# Patient Record
Sex: MALE | Race: WHITE | NOT HISPANIC OR LATINO | URBAN - METROPOLITAN AREA
[De-identification: names, ages, dates, MRNs, and addresses within clinical notes are randomized per-mention and may not be internally consistent; named-entity substitution may affect disease eponyms.]

---

## 2018-05-30 ENCOUNTER — OFFICE VISIT (OUTPATIENT)
Dept: FAMILY MEDICINE | Facility: CLINIC | Age: 23
End: 2018-05-30
Payer: COMMERCIAL

## 2018-05-30 VITALS
RESPIRATION RATE: 16 BRPM | HEART RATE: 60 BPM | TEMPERATURE: 97.8 F | DIASTOLIC BLOOD PRESSURE: 60 MMHG | WEIGHT: 161 LBS | BODY MASS INDEX: 23.85 KG/M2 | OXYGEN SATURATION: 97 % | HEIGHT: 69 IN | SYSTOLIC BLOOD PRESSURE: 112 MMHG

## 2018-05-30 DIAGNOSIS — J30.2 SEASONAL ALLERGIC RHINITIS, UNSPECIFIED CHRONICITY, UNSPECIFIED TRIGGER: ICD-10-CM

## 2018-05-30 DIAGNOSIS — Z00.121 ENCOUNTER FOR WCC (WELL CHILD CHECK) WITH ABNORMAL FINDINGS: Primary | ICD-10-CM

## 2018-05-30 DIAGNOSIS — R06.00 DYSPNEA, UNSPECIFIED TYPE: ICD-10-CM

## 2018-05-30 DIAGNOSIS — Z23 IMMUNIZATION DUE: ICD-10-CM

## 2018-05-30 RX ORDER — CETIRIZINE HYDROCHLORIDE 10 MG/1
10 TABLET ORAL EVERY EVENING
Qty: 30 TABLET | Refills: 1 | Status: SHIPPED | OUTPATIENT
Start: 2018-05-30 | End: 2018-08-20

## 2018-05-30 RX ORDER — FLUTICASONE PROPIONATE 50 MCG
1-2 SPRAY, SUSPENSION (ML) NASAL DAILY
Qty: 3 BOTTLE | Refills: 1 | Status: SHIPPED | OUTPATIENT
Start: 2018-05-30

## 2018-05-30 NOTE — MR AVS SNAPSHOT
"              After Visit Summary   5/30/2018    Saad Lyles Jr.    MRN: [id number]           Patient Information     Date Of Birth          1995        Visit Information        Provider Department      5/30/2018 1:20 PM Rosenstein, Benjamin, MD [facility]        Today's Diagnoses     Encounter for WCC (well child check) with abnormal findings    -  1    Immunization due        Seasonal allergic rhinitis, unspecified chronicity, unspecified trigger          Care Instructions    Thanks for coming in today    For your breathing  - Use the flonase nasal spray daily to help with your allergies. Spray back and toward your ears  - Use the zyrtec (cetirizine) daily to help control your allergies  - Come back in 1 week so we can do tests to check if your breathing issues are also caused by asthma. If so, then we can treat it better.    For your stomach  - Take takes as needed to help control symptoms  - If this doesn't work, you can take Zantac up to twice a day as needed    We'll see you again in a week!          Follow-ups after your visit        Who to contact     Please call your clinic at [phone] to:    Ask questions about your health    Make or cancel appointments    Discuss your medicines    Learn about your test results    Speak to your doctor            Additional Information About Your Visit        Care EveryWhere ID     This is your Care EveryWhere ID. This could be used by other organizations to access your [facility] medical records  [id number]        Your Vitals Were     Pulse Temperature Respirations Height Pulse Oximetry BMI (Body Mass Index)    60 97.8  F (36.6  C) (Oral) 16 5' 8.7\" (174.5 cm) 97% 23.98 kg/m2       Blood Pressure from Last 3 Encounters:   05/30/18 112/60   08/12/16 112/52   05/09/16 100/64    Weight from Last 3 Encounters:   05/30/18 161 lb (73 kg)   08/12/16 157 lb 9.6 oz (71.5 kg) (59 %)*   05/09/16 159 lb 6.4 oz (72.3 kg) (63 %)*     * Growth percentiles are " based on [facility] 2-20 Years data.              We Performed the Following     ADMIN VACCINE, INITIAL     HPV9 (Gardasil 9 )     SCREENING TEST, PURE TONE, AIR ONLY     SCREENING, VISUAL ACUITY, QUANTITATIVE, BILAT          Today's Medication Changes          These changes are accurate as of 5/30/18  2:24 PM.  If you have any questions, ask your nurse or doctor.               Start taking these medicines.        Dose/Directions    cetirizine 10 MG tablet   Commonly known as:  zyrTEC   Used for:  Seasonal allergic rhinitis, unspecified chronicity, unspecified trigger   Started by:  Rosenstein, Benjamin, MD        Dose:  10 mg   Take 1 tablet (10 mg) by mouth every evening   Quantity:  30 tablet   Refills:  1         These medicines have changed or have updated prescriptions.        Dose/Directions    * fluticasone 50 MCG/ACT spray   Commonly known as:  FLONASE   This may have changed:  Another medication with the same name was added. Make sure you understand how and when to take each.   Used for:  Seasonal allergic rhinitis   Changed by:  Rosenstein, Benjamin, MD        Dose:  1-2 spray   Spray 1-2 sprays into both nostrils daily   Quantity:  16 g   Refills:  0       * fluticasone 50 MCG/ACT spray   Commonly known as:  FLONASE   This may have changed:  You were already taking a medication with the same name, and this prescription was added. Make sure you understand how and when to take each.   Used for:  Seasonal allergic rhinitis, unspecified chronicity, unspecified trigger   Changed by:  Rosenstein, Benjamin, MD        Dose:  1-2 spray   Spray 1-2 sprays into both nostrils daily   Quantity:  3 Bottle   Refills:  1       * Notice:  This list has 2 medication(s) that are the same as other medications prescribed for you. Read the directions carefully, and ask your doctor or other care provider to review them with you.         Where to get your medicines      These medications were sent to [id number] - [place], [place] - [address] AT [facility] [place] [facility] [address]  [address], [place] [address]     Phone:  [phone]     cetirizine 10 MG tablet    fluticasone 50 MCG/ACT spray                Primary Care Provider Office Phone # Fax #    Antonio Hickey [phone]       [facility] [address]        Equal Access to Services     COLT STEINER AH: Hadii aad ku hadasho Soomaali, waaxda luqadaha, qaybta kaalmada adeegyada, waxay idiin hayaan adeeg kharash la'aan ah. So [facility] [phone].    ATENCIÓN: Si habla español, tiene a zamora disposición servicios gratuitos de asistencia lingüística. [facility] [phone].    We comply with applicable federal civil rights laws and [place] laws. We do not discriminate on the basis of race, color, national origin, age, disability, sex, sexual orientation, or gender identity.            Thank you!     Thank you for choosing [facility]  for your care. Our goal is always to provide you with excellent care. Hearing back from our patients is one way we can continue to improve our services. Please take a few minutes to complete the written survey that you may receive in the mail after your visit with us. Thank you!             Your Updated Medication List - Protect others around you: Learn how to safely use, store and throw away your medicines at www.disposemymeds.org.          This list is accurate as of 5/30/18  2:24 PM.  Always use your most recent med list.                   Brand Name Dispense Instructions for use Diagnosis    cetirizine 10 MG tablet    zyrTEC    30 tablet    Take 1 tablet (10 mg) by mouth every evening    Seasonal allergic rhinitis, unspecified chronicity, unspecified trigger       fexofenadine 180 MG tablet    ALLEGRA    30 tablet    Take 1 tablet (180 mg) by mouth daily    Seasonal allergic rhinitis       * fluticasone 50 MCG/ACT spray    FLONASE    16 g    Spray 1-2 sprays into both nostrils  daily    Seasonal allergic rhinitis       * fluticasone 50 MCG/ACT spray    FLONASE    3 Bottle    Spray 1-2 sprays into both nostrils daily    Seasonal allergic rhinitis, unspecified chronicity, unspecified trigger       * Notice:  This list has 2 medication(s) that are the same as other medications prescribed for you. Read the directions carefully, and ask your doctor or other care provider to review them with you.

## 2018-05-30 NOTE — PATIENT INSTRUCTIONS
Thanks for coming in today    For your breathing  - Use the flonase nasal spray daily to help with your allergies. Spray back and toward your ears  - Use the zyrtec (cetirizine) daily to help control your allergies  - Come back in 1 week so we can do tests to check if your breathing issues are also caused by asthma. If so, then we can treat it better.    For your stomach  - Take takes as needed to help control symptoms  - If this doesn't work, you can take Zantac up to twice a day as needed    We'll see you again in a week!

## 2018-05-30 NOTE — PROGRESS NOTES
"    Child & Teen Check Up Year 18-20       Health History       Growth Percentile:    Wt Readings from Last 3 Encounters:   05/30/18 161 lb (73 kg)   08/12/16 157 lb 9.6 oz (71.5 kg) (59 %)*   05/09/16 159 lb 6.4 oz (72.3 kg) (63 %)*     * Growth percentiles are based on [facility] 2-20 Years data.      Ht Readings from Last 2 Encounters:   05/30/18 5' 8.7\" (174.5 cm)   08/12/16 5' 8.5\" (174 cm) (36 %)*     * Growth percentiles are based on [facility] 2-20 Years data.    Normalized BMI data available only for age 0 to 20 years.    Visit Vitals: /60  Pulse 60  Temp 97.8  F (36.6  C) (Oral)  Resp 16  Ht 5' 8.7\" (174.5 cm)  Wt 161 lb (73 kg)  SpO2 97%  BMI 23.98 kg/m2  BP Percentile: Normalized stature-for-age data not available for patients older than 20 years.     Informant: Patient    Patient, Family speaks English   Family History:   Family History   Problem Relation Age of Onset     Asthma Father      Migraines Other      Seizure Disorder Other      Hypertension Other        Dyslipidemia Screening:  Pediatric hyperlipidemia risk factors discussed today: No increased risk  Lipid screening performed (recommended if any risk factors): No    Social History:     Did the family/guardian worry about wether their food would run out before they got money to buy more? No  Did the family/guardian find that the food they bought didn't last long enough and they didn't have money to get more?  No    Social History     Social History     Marital status: Single     Spouse name: N/A     Number of children: N/A     Years of education: N/A     Occupational History     Student      Social History Main Topics     Smoking status: Passive Smoke Exposure - Never Smoker     Smokeless tobacco: Never Used     Alcohol use No     Drug use: No     Sexual activity: Not Asked     Other Topics Concern     None     Social History Narrative    Education completed so far: grade 11th           Medical History:   Past Medical History:   Diagnosis Date " "    Depression        Family History and past Medical History reviewed and unchanged/updated.    Vision Screen: Passed.  Hearing Screen: Passed.  Parental/or patient concerns:   Allergies: Seasonal allergies. Worse with warm weather. Difficulty with being outside, dust, cat at home. Notes symptoms with red eyes, runny nose. Benadryl has been helpful. Mother states her inhaler also has been helpful. Used to take allegra, helped more with breathing than allergy symptoms.     Asthma: Dad and sister with asthma. No diagnosis of asthma. Was hospitalized when younger and needed nebulizers. Never prescribed inhalers in the past. Used mothers albuterol inhaler and helped a lot. Breathing is worse with cold air, working out, allergies, sometimes wakes at night with cough particularly with current allergies. Has had wheezes before, more of coughing issues when having trouble breathing. Been going on for years. Previously had nasal spray which helped a little as well.     Stomach Pain: \"Acid reflux.\" Worse with spicy foods. Gets feeling of needing to belch but can't. Cold water helps. Hasn't tried anything for it such as tums or zantac. Occurring for couple months now. Says currently better now with not eating spicy foods.     Denies recent illnesses, nausea/vomiting, diarrhea/constipation. No chest pains. No rashes. No urinary symptoms.    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 Eat as a family occasionally but he works at night.     STI Screening:  STI (including HIV) risk behaviors discussed today: Yes    Dental:  Have you been to a dentist this year? Yes and verbally encouraged family to continue to have annual dental check-up     Mental Health:  HEADSSS SCREENING:    HOME  Do you get along with your parents/siblings? Yes, mom and BF at home  Do you have at least one adult you can really talk to? " "Yes    EDUCATION  Do you have career or college plans after high school? Work toward supervisor position at [employer], consider being a paramedic    ACTIVITIES  Do you get some exercise at least 3 times a week? Yes, lifting and swimming  Do you feel you are about the right weight for your height? Yes    DRUGS  Do you smoke cigarettes or chew tobacco? No  Do you drink alcohol or use any type of drugs? No    SEX  Have you ever had sex? No    SUICIDE/DEPRESSION  Do you ever feel down or depressed? Better in summer, not feeling depressed    SAFETY  Do you feel afraid in any of your relationships? Yes  Nutrition:  Balance of fruits and vegetables with protein to help recover with exercise  Guidance: STDs, safer sex.       ROS      ROS: 10 point ROS neg other than the symptoms noted above with Patient Concerns         Physical Exam:   /60  Pulse 60  Temp 97.8  F (36.6  C) (Oral)  Resp 16  Ht 5' 8.7\" (174.5 cm)  Wt 161 lb (73 kg)  SpO2 97%  BMI 23.98 kg/m2    GENERAL: Alert, well nourished, well developed, no acute distress, interacts appropriately for age  SKIN: skin is clear, no rash, mild facial acne  HEAD: The head is normocephalic.  EYES: Corneas clear, mild conjunctivitis. PERRL, EOMI  EARS: The external auditory canals are clear and the tympanic membranes are normal; gray and transluscent.  NOSE: Mild Rhinorrhea  MOUTH/THROAT: The throat is clear, tonsils:normal, no exudate or lesions. Normal teeth without obvious abnormalities  NECK: The neck is supple and thyroid is normal, no masses  LYMPH NODES: No adenopathy  LUNGS: The lung fields are clear to auscultation,no rales, rhonchi, wheezing or retractions  HEART:RRR, normal S1 and S2, No murmurs.  ABDOMEN: The bowel sounds are normal. Abdomen soft, non tender,  non distended, no masses or hepatosplenomegaly.  GENITALIA: Normal male external genitalia. Testes descended bilateraly, no hernia or hydrocele  EXTREMITIES: Symmetric extremities, FROM, no " deformities  NEUROLOGIC: No focal findings. Cranial nerves grossly intact: DTR's normal. Normal gait, strength and tone         Assessment and Plan   Reason for Visit:   Chief Complaint   Patient presents with     Well Child     22 year old [facility]     Gastrointestinal Problem     acid reflux, heartburn.      Asthma     recheck asthma, allergies     Saad Lyles Jr is an otherwise healthy 22 year old male who presents for annual wellness check in which multiple further concerns were addressed as follows:    1. Encounter for [facility] (well child check) with abnormal findings  Overall doing well. Discussed future plans including moving up in his current work as well as possibility of technical school in the future. Currently not in relationship, no sexual contact. Discussed safe sex practices.   - SCREENING TEST, PURE TONE, AIR ONLY  - SCREENING, VISUAL ACUITY, QUANTITATIVE, BILAT    2. Seasonal allergic rhinitis, unspecified chronicity, unspecified trigger  Environmental allergies with rhinitis, conjunctivitis. Has responded to antihistamines previously. Advised daily use of nasal steroid to help with general inflammation. Also prescribed cetirizine daily for symptoms and to avoid drowsiness.   - fluticasone (FLONASE) 50 MCG/ACT spray; Spray 1-2 sprays into both nostrils daily  Dispense: 3 Bottle; Refill: 1  - cetirizine (ZYRTEC) 10 MG tablet; Take 1 tablet (10 mg) by mouth every evening  Dispense: 30 tablet; Refill: 1    3. Dyspnea, unspecified type  Notes history of SOB with multiple triggers consistent with asthma including cold air, allergies, exercise. Also has responded to albuterol inhaler as well. No previous diagnosis of asthma, no notable wheezing per history or on exam. Possibly more allergies with upper airway congestion vs mild asthma. Discussed spirometry to help diagnose and treat appropriately. Unable to complete today due to patient's schedule, placed order and advised to return in a week to complete test  and medication management pending those results.   - Spirometry Pre/Post Admin (Bronchodilation Response); Future    4. Immunization due  Given first of HPV series  - ADMIN VACCINE, INITIAL  - HPV9 (Gardasil 9 )    No referrals were made today.    Immunizations:    Hx immunization reactions?  No  Immunization schedule reviewed: Yes:  Following immunizations advised:  Tdap (if not given when entering 7th grade) Up to date for this immunization  Meningococcal (MCV) (If given before age 16 needs a booster at 16+ yo Up to date for this immunization  HPV Vaccine (Gardasil)  recommended for all at age 11 years: Offered and accepted.    RTC in 1 week for spirometry    Benjamin Rosenstein, MD, MA  [facility] [place]  P: [phone]    Precepted today with: Dr. Guillaume Kidd III

## 2018-05-30 NOTE — NURSING NOTE
Well child hearing and vision screening        HEARING FREQUENCY:  Right Ear:    500 Hz: 25 db HL present  1000 Hz: 20 db HL  present  2000 Hz: 20 db HL  present  4000 Hz: 20 db HL  present  6000 Hz: 20 dB HL (11 years and older)  present    Left Ear:    500 Hz: 25 db HL  present  1000 Hz: 20 db HL  present  2000 Hz: 20 db HL  present  4000 Hz: 20 db HL  present  6000 Hz: 20 dB HL (11 years and older)  present    Hearing Screen:  Pass-- [place] all tones    VISION:  Far vision: Right eye 20/25, Left eye 20/25  Passed 2.50    TASHA LATHAM, CMA

## 2018-05-31 PROBLEM — R06.00 DYSPNEA: Status: ACTIVE | Noted: 2018-05-31

## 2018-06-05 NOTE — PROGRESS NOTES
Preceptor Attestation:   Patient seen, evaluated and discussed with the resident. I have verified the content of the note, which accurately reflects my assessment of the patient and the plan of care.    Supervising Physician:Guillaume Kidd MD    [facility]

## 2018-07-02 ENCOUNTER — OFFICE VISIT (OUTPATIENT)
Dept: FAMILY MEDICINE | Facility: CLINIC | Age: 23
End: 2018-07-02
Payer: COMMERCIAL

## 2018-07-02 VITALS
WEIGHT: 163 LBS | HEIGHT: 68 IN | HEART RATE: 53 BPM | TEMPERATURE: 97.9 F | BODY MASS INDEX: 24.71 KG/M2 | DIASTOLIC BLOOD PRESSURE: 66 MMHG | OXYGEN SATURATION: 98 % | SYSTOLIC BLOOD PRESSURE: 137 MMHG

## 2018-07-02 DIAGNOSIS — J30.2 CHRONIC SEASONAL ALLERGIC RHINITIS, UNSPECIFIED TRIGGER: Primary | ICD-10-CM

## 2018-07-02 ASSESSMENT — ENCOUNTER SYMPTOMS
RESPIRATORY NEGATIVE: 1
CARDIOVASCULAR NEGATIVE: 1
CONSTITUTIONAL NEGATIVE: 1

## 2018-07-02 NOTE — NURSING NOTE
"Chief Complaint   Patient presents with     Asthma     follow up.      Medication Reconciliation     completed but needs attention.        /66  Pulse 53  Temp 97.9  F (36.6  C) (Oral)  Ht 5' 8\" (172.7 cm)  Wt 163 lb (73.9 kg)  SpO2 98%  BMI 24.78 kg/m2    "

## 2018-07-02 NOTE — PROGRESS NOTES
Assessment and Plan     1. Chronic seasonal allergic rhinitis, unspecified trigger  Discussed natural history of disease. Discussed treatments. Discussed the proper use of Flonase. Start the Flonase and continue the Zyrtec. Discussed time of onset of improvements in symptoms. Discussed side effects. Return to clinic if not improving in 1-2 months. Answered all the patient's questions. Patient verbalized understanding. Plan to add nasal Atrovent or Singulair at the next visit if still uncontrolled.    Overall, I counseled and coordinated care for greater than 50% of the 25 minute face-to-face visit.    Options for treatment and follow-up care were reviewed with the patient and/or guardian. Saad Lyles JrAnn and/or guardian engaged in the decision making process and verbalized understanding of the options discussed and agreed with the final plan. I answered all of their questions.    Guillaume Kidd III, MD, FAAFP  [facility]  07/02/18 1:27 PM           HPI:       Saad Lyles Jr. is a 22 year old  male  Patient presents with:  Asthma: follow up.   Medication Reconciliation: completed but needs attention.     Complains of year round symptoms, worst at beginning of summer. Runny nose, itch watery eyes, congestion. Symptoms get worse outside. Zyrtec has questionable beneft. Has only taken 2-3 times. Has not yet tried his Flonase. Running symptoms really happen when he runs outside. He otherwise doesn't have problems with SOB.    Denies smoking.         PMHX:     Patient Active Problem List   Diagnosis     Seasonal allergic rhinitis     Dyspnea       Current Outpatient Prescriptions   Medication Sig Dispense Refill     cetirizine (ZYRTEC) 10 MG tablet Take 1 tablet (10 mg) by mouth every evening 30 tablet 1     fluticasone (FLONASE) 50 MCG/ACT spray Spray 1-2 sprays into both nostrils daily (Patient not taking: Reported on 7/2/2018) 3 Bottle 1       Social History     Social History     Marital  "status: Single     Spouse name: N/A     Number of children: N/A     Years of education: N/A     Occupational History     Student      Social History Main Topics     Smoking status: Passive Smoke Exposure - Never Smoker     Smokeless tobacco: Never Used     Alcohol use No     Drug use: No     Sexual activity: Not on file     Other Topics Concern     Not on file     Social History Narrative    Education completed so far: grade 11th           Allergies   Allergen Reactions     No Known Allergies        No results found for this or any previous visit (from the past 24 hour(s)).         Review of Systems:     Review of Systems   Constitutional: Negative.    Respiratory: Negative.    Cardiovascular: Negative.    Skin: Negative.    All other systems reviewed and are negative.           Physical Exam:     Vitals:    07/02/18 1307   BP: 137/66   Pulse: 53   Temp: 97.9  F (36.6  C)   TempSrc: Oral   SpO2: 98%   Weight: 163 lb (73.9 kg)   Height: 5' 8\" (172.7 cm)     Body mass index is 24.78 kg/(m^2).    Physical Exam   Constitutional: He is oriented to person, place, and time and well-developed, well-nourished, and in no distress. He appears to not be writhing in pain.  Non-toxic appearance. He does not have a sickly appearance.   HENT:   Right Ear: Hearing, tympanic membrane, external ear and ear canal normal. Tympanic membrane is not injected, not scarred, not perforated, not erythematous, not retracted and not bulging.   Left Ear: Hearing, tympanic membrane, external ear and ear canal normal. Tympanic membrane is not injected, not scarred, not perforated, not erythematous, not retracted and not bulging.   Nose: Mucosal edema and rhinorrhea present. No nose lacerations or sinus tenderness.   Cardiovascular: Normal rate, normal heart sounds and intact distal pulses.  Exam reveals no gallop and no friction rub.    No murmur heard.  Pulmonary/Chest: Effort normal and breath sounds normal. No respiratory distress. He has no " wheezes. He has no rales.   Neurological: He is alert and oriented to person, place, and time. Gait normal. GCS score is 15.   Psychiatric: Affect normal.   Nursing note and vitals reviewed.

## 2018-07-02 NOTE — PATIENT INSTRUCTIONS
Remember to take your allergy medication Cetirizine every day.     Start using the Flonase nasal spray daily and regularly. You should start to notice a difference within 1-2 weeks after starting. You shouldn't taste it right away and if you do, you sprayed incorrectly inside the nostril/s.     You can also try doing some salt water nasal washes. (ex: netti pot ) BUT, don't do this after you've already used your Flonase.     Set an alarm on your phone to remind yourself to take your medications.     NORMAL breathing test back in 2016.     STOP using your mom's inhaler/nebulizer.     FOLLOW UP in 2-3 months if not feeling better after using the Flonase regularly or sooner if needed.       Your medication list is printed, please keep this with you, it is helpful to bring this current list to any other medical appointments, the emergency room or hospital.    If you had lab testing today and your results are reassuring or normal they will be be mailed to you within 7 days.     If the lab tests need quick action we will call you with the results.   The phone number we will call with results is # [phone] (home) . If this is not the best number please call our clinic and change the number.    If you need any refills please call your pharmacy and they will contact us.    If you have any further concerns or wish to schedule another appointment you must call our office during normal business hours  [phone] (8-5:00 M-F)  If you have urgent medical questions that cannot wait  you may also call [phone] at any time of day.  If you have a medical emergency please call 587.    Thank you for coming to [facility].

## 2018-07-02 NOTE — MR AVS SNAPSHOT
After Visit Summary   7/2/2018    Saad Lyles Jr.    MRN: [id number]           Patient Information     Date Of Birth          1995        Visit Information        Provider Department      7/2/2018 1:00 PM Guillaume Kidd MD [facility]        Today's Diagnoses     Dyspnea, unspecified type    -  1      Care Instructions      Remember to take your allergy medication Cetirizine every day.     Start using the Flonase nasal spray daily and regularly. You should start to notice a difference within 1-2 weeks after starting. You shouldn't taste it right away and if you do, you sprayed incorrectly inside the nostril/s.     You can also try doing some salt water nasal washes. (ex: netti pot ) BUT, don't do this after you've already used your Flonase.     Set an alarm on your phone to remind yourself to take your medications.     NORMAL breathing test back in 2016.     STOP using your mom's inhaler/nebulizer.     FOLLOW UP in 2-3 months if not feeling better after using the Flonase regularly or sooner if needed.       Your medication list is printed, please keep this with you, it is helpful to bring this current list to any other medical appointments, the emergency room or hospital.    If you had lab testing today and your results are reassuring or normal they will be be mailed to you within 7 days.     If the lab tests need quick action we will call you with the results.   The phone number we will call with results is # [phone] (home) . If this is not the best number please call our clinic and change the number.    If you need any refills please call your pharmacy and they will contact us.    If you have any further concerns or wish to schedule another appointment you must call our office during normal business hours  [phone] (8-5:00 M-F)  If you have urgent medical questions that cannot wait  you may also call [phone] at any time of day.  If you have a medical emergency  "please call 911.    Thank you for coming to [facility].            Follow-ups after your visit        Who to contact     Please call your clinic at [phone] to:    Ask questions about your health    Make or cancel appointments    Discuss your medicines    Learn about your test results    Speak to your doctor            Additional Information About Your Visit        Care EveryWhere ID     This is your Care EveryWhere ID. This could be used by other organizations to access your [place] medical records  [id number]        Your Vitals Were     Pulse Temperature Height Pulse Oximetry BMI (Body Mass Index)       53 97.9  F (36.6  C) (Oral) 5' 8\" (172.7 cm) 98% 24.78 kg/m2        Blood Pressure from Last 3 Encounters:   07/02/18 137/66   05/30/18 112/60   08/12/16 112/52    Weight from Last 3 Encounters:   07/02/18 163 lb (73.9 kg)   05/30/18 161 lb (73 kg)   08/12/16 157 lb 9.6 oz (71.5 kg) (59 %)*     * Growth percentiles are based on CDC 2-20 Years data.              Today, you had the following     No orders found for display       Primary Care Provider Office Phone # Fax #    Antonio Hickey [phone]       [address] [place] [address]        Equal Access to Services     COLT STEINER AH: Hadii ambrosio martinez hadasho Soomaali, waaxda luqadaha, qaybta kaalmada adeegyada, savanna toscano. So [facility] [phone].    ATENCIÓN: Si habla español, tiene a zamora disposición servicios gratuitos de asistencia lingüística. Llame al [phone].    We comply with applicable federal civil rights laws and [place] laws. We do not discriminate on the basis of race, color, national origin, age, disability, sex, sexual orientation, or gender identity.            Thank you!     Thank you for choosing [facility]  for your care. Our goal is always to provide you with excellent care. Hearing back from our patients is one way we can continue to improve our services. " Please take a few minutes to complete the written survey that you may receive in the mail after your visit with us. Thank you!             Your Updated Medication List - Protect others around you: Learn how to safely use, store and throw away your medicines at www.disposemymeds.org.          This list is accurate as of 7/2/18  1:26 PM.  Always use your most recent med list.                   Brand Name Dispense Instructions for use Diagnosis    cetirizine 10 MG tablet    zyrTEC    30 tablet    Take 1 tablet (10 mg) by mouth every evening    Seasonal allergic rhinitis, unspecified chronicity, unspecified trigger       fluticasone 50 MCG/ACT spray    FLONASE    3 Bottle    Spray 1-2 sprays into both nostrils daily    Seasonal allergic rhinitis, unspecified chronicity, unspecified trigger

## 2018-07-03 ASSESSMENT — ASTHMA QUESTIONNAIRES: ACT_TOTALSCORE: 20

## 2018-07-03 ASSESSMENT — PATIENT HEALTH QUESTIONNAIRE - PHQ9: SUM OF ALL RESPONSES TO PHQ QUESTIONS 1-9: 12

## 2018-08-20 DIAGNOSIS — J30.2 SEASONAL ALLERGIC RHINITIS, UNSPECIFIED CHRONICITY, UNSPECIFIED TRIGGER: ICD-10-CM

## 2018-08-21 RX ORDER — CETIRIZINE HYDROCHLORIDE 10 MG/1
10 TABLET ORAL EVERY EVENING
Qty: 30 TABLET | Refills: 11 | Status: SHIPPED | OUTPATIENT
Start: 2018-08-21 | End: 2020-03-27

## 2020-03-26 DIAGNOSIS — J30.1 ALLERGIC RHINITIS DUE TO POLLEN, UNSPECIFIED SEASONALITY: Primary | ICD-10-CM

## 2020-03-26 DIAGNOSIS — J30.2 SEASONAL ALLERGIC RHINITIS: ICD-10-CM

## 2020-03-27 RX ORDER — CETIRIZINE HYDROCHLORIDE 10 MG/1
TABLET ORAL
Qty: 90 TABLET | Refills: 1 | Status: SHIPPED | OUTPATIENT
Start: 2020-03-27

## 2020-09-03 ENCOUNTER — NURSE TRIAGE (OUTPATIENT)
Dept: NURSING | Facility: CLINIC | Age: 25
End: 2020-09-03

## 2020-09-03 ENCOUNTER — VIRTUAL VISIT (OUTPATIENT)
Dept: URGENT CARE | Facility: CLINIC | Age: 25
End: 2020-09-03
Payer: COMMERCIAL

## 2020-09-03 DIAGNOSIS — R06.02 SHORTNESS OF BREATH: Primary | ICD-10-CM

## 2020-09-03 PROCEDURE — 99213 OFFICE O/P EST LOW 20 MIN: CPT | Mod: 95 | Performed by: EMERGENCY MEDICINE

## 2020-09-03 NOTE — PROGRESS NOTES
HPI: Patient is a 24-year-old male who is had a 3-day history of dizziness, dyspnea and headache.  He is also had some slight cough which is now better.  He is also had occasional sweats.  He lives with a grandmother and a cousin who both have tested positive for COVID.  He has no chronic respiratory or other chronic diseases.      ROS: See HPI otherwise normal.    Allergies   Allergen Reactions     No Known Allergies       Current Outpatient Medications   Medication Sig Dispense Refill     cetirizine (ZYRTEC) 10 MG tablet TAKE ONE TABLET BY MOUTH ONCE DAILY EVERY EVENING 90 tablet 1     fluticasone (FLONASE) 50 MCG/ACT spray Spray 1-2 sprays into both nostrils daily (Patient not taking: Reported on 7/2/2018) 3 Bottle 1         PE: No acute distress and nondyspneic sounding on the phone.        TREATMENT: None      ASSESSMENT: Mild symptoms suspicious for COVID infection in a high risk exposure situation.      DIAGNOSIS: Shortness of breath      PLAN: COVID PCR ordered.  Testing team to follow.  Follow-up with PCP or this phone appointment service if worsening symptoms or concerns.    Time: 10 minutes

## 2020-09-03 NOTE — TELEPHONE ENCOUNTER
"Patient is calling with symptoms of possible covid. He lives in the same home with his cousin that tested positive for covid on Friday. He now has symptoms of fever, headache, and mild shortness of breath.   They do not have access to a computer to do On Care so a virtual phone visit will be scheduled.     Answer Assessment - Initial Assessment Questions  1. CLOSE CONTACT: \"Who is the person with the confirmed or suspected COVID-19 infection that you were exposed to?\"      cousin  2. PLACE of CONTACT: \"Where were you when you were exposed to COVID-19?\" (e.g., home, school, medical waiting room; which city?)      home  3. TYPE of CONTACT: \"How much contact was there?\" (e.g., sitting next to, live in same house, work in same office, same building)      Living in same house  4. DURATION of CONTACT: \"How long were you in contact with the COVID-19 patient?\" (e.g., a few seconds, passed by person, a few minutes, live with the patient)      Regular contact  5. DATE of CONTACT: \"When did you have contact with a COVID-19 patient?\" (e.g., how many days ago)      daily  6. TRAVEL: \"Have you traveled out of the country recently?\" If so, \"When and where?\"      * Also ask about out-of-state travel, since the CDC has identified some high-risk cities for community spread in the .      * Note: Travel becomes less relevant if there is widespread community transmission where the patient lives.      no  7. COMMUNITY SPREAD: \"Are there lots of cases of COVID-19 (community spread) where you live?\" (See public health department website, if unsure)        yes  8. SYMPTOMS: \"Do you have any symptoms?\" (e.g., fever, cough, breathing difficulty)      Fever -over 100, shortness of breath, headache,   9. PREGNANCY OR POSTPARTUM: \"Is there any chance you are pregnant?\" \"When was your last menstrual period?\" \"Did you deliver in the last 2 weeks?\"      no  10. HIGH RISK: \"Do you have any heart or lung problems? Do you have a weak immune system?\" " (e.g., CHF, COPD, asthma, HIV positive, chemotherapy, renal failure, diabetes mellitus, sickle cell anemia)        asthma    [facility] Specific Disposition  - [facility] [place] Specific Patient Instructions  COVID 19 Nurse Triage Plan/Patient Instructions    Please be aware that novel coronavirus (COVID-19) may be circulating in the community. If you develop symptoms such as fever, cough, or SOB or if you have concerns about the presence of another infection including coronavirus (COVID-19), please contact your health care provider or visit [email or web address].       Virtual Visit with provider recommended. Reference Visit Selection Guide.    Thank you for taking steps to prevent the spread of this virus.  Limit your contact with others.  Wear a simple mask to cover your cough.  Wash your hands well and often.    Resources  [facility]: About COVID-19: [email or web address]/  CDC: What to Do If You're Sick: www.cdc.gov/coronavirus/2019-ncov/about/steps-when-sick.html  CDC: Ending Home Isolation: www.cdc.gov/coronavirus/2019-ncov/hcp/disposition-in-home-patients.html   CDC: Caring for Someone: www.cdc.gov/coronavirus/2019-ncov/if-you-are-sick/care-for-someone.html   [facility]: Interim Guidance for Hospital Discharge to Home: www.health.[facility]/diseases/coronavirus/hcp/hospdischarge.pdf  [facility] clinical trials (COVID-19 research studies): clinicalaffairs.[facility]-clinical-trials   Below are the COVID-19 hotlines at the [facility] ([facility]). Interpreters are available.   For health questions: Call 302-955-6724 or 1-476.515.2471 (7 a.m. to 7 p.m.)  For questions about schools and childcare: Call 237-433-6410 or 1-147.807.7400 (7 a.m. to 7 p.m.)    Protocols used: CORONAVIRUS (COVID-19) EXPOSURE-A- 8.4.20    Zoila Taveras RN on 9/3/2020 at 11:07 AM

## 2022-02-15 ENCOUNTER — OFFICE VISIT (OUTPATIENT)
Dept: FAMILY MEDICINE | Facility: CLINIC | Age: 27
End: 2022-02-15
Payer: COMMERCIAL

## 2022-02-15 VITALS
OXYGEN SATURATION: 97 % | RESPIRATION RATE: 20 BRPM | DIASTOLIC BLOOD PRESSURE: 66 MMHG | SYSTOLIC BLOOD PRESSURE: 122 MMHG | TEMPERATURE: 98.1 F | BODY MASS INDEX: 24.35 KG/M2 | HEIGHT: 69 IN | WEIGHT: 164.4 LBS | HEART RATE: 65 BPM

## 2022-02-15 DIAGNOSIS — L73.9 FOLLICULITIS: Primary | ICD-10-CM

## 2022-02-15 PROCEDURE — 99203 OFFICE O/P NEW LOW 30 MIN: CPT | Performed by: FAMILY MEDICINE

## 2022-02-15 RX ORDER — SULFAMETHOXAZOLE/TRIMETHOPRIM 800-160 MG
1 TABLET ORAL 2 TIMES DAILY
Qty: 14 TABLET | Refills: 0 | Status: SHIPPED | OUTPATIENT
Start: 2022-02-15 | End: 2022-02-22

## 2022-02-15 ASSESSMENT — MIFFLIN-ST. JEOR: SCORE: 1726.09

## 2022-02-15 NOTE — PATIENT INSTRUCTIONS
- Start taking the Bactrim antibiotic and take this twice per day for 7 days.   - Avoid putting anything on the area.   - Avoid any friction or irritation as it is healing and no hot tubs for now.   - If not improving by Friday, call to set up an appointment to reassess.

## 2022-02-15 NOTE — PROGRESS NOTES
"  Assessment & Plan     Folliculitis  Presentation consistent with folliculitis. Given lack of improvement with topical antibiotics and amount of skin involved, will treat with systemic antibiotics. Workout exposure may be the cause. Covering for MRSA but may also represent Pseudomonas given hot tub exposure.   - sulfamethoxazole-trimethoprim (BACTRIM DS) 800-160 MG tablet; Take 1 tablet by mouth 2 times daily for 7 days         Close follow up if not improving.     Karol Dejesus MD  [facility]    Annmarie Nash is a 26 year old who presents for the following health issues     HPI   He is here today with a red rash on his right lateral leg that started last week. It started as red bumps above the ankle and has been climbing up the leg. It now covers a larger area about the size of his hand. Not itchy and no swelling. Never any blisters. No new lotions, soaps, laundry detergents. No one else with a similar rash at home. He recently moved back in with his mother and isn't sure if she uses any different cleaning products. No other symptoms of concern including no fevers/chills, nausea/vomiting, fatigue or body aches. He has tried using topical antibiotic ointment (Neosporin) without improvement.    He wears workout leggings at the gym but washes them frequently. He was in a hot tub about 2 weeks ago, just before the rash broke out.    No significant PMH history. No history of similar rashes.         Objective    /66 (BP Location: Right arm, Patient Position: Sitting, Cuff Size: Adult Regular)   Pulse 65   Temp 98.1  F (36.7  C) (Oral)   Resp 20   Ht 1.753 m (5' 9\")   Wt 74.6 kg (164 lb 6.4 oz)   SpO2 97%   BMI 24.28 kg/m    Body mass index is 24.28 kg/m .  Physical Exam   GENERAL: healthy, alert and no distress  RESP: normal rate and effort.  CV:  no peripheral edema and peripheral pulses strong  MS: no gross musculoskeletal defects noted  SKIN: Erythematous papular " rash overlying the hair follicles and covering an area 7 x 11 cm on the lateral aspect of the right calf. Some overlying areas of eroded skin with some crusting.

## 2023-04-13 ENCOUNTER — HOSPITAL ENCOUNTER (EMERGENCY)
Facility: HOSPITAL | Age: 28
Discharge: HOME | End: 2023-04-13
Attending: EMERGENCY MEDICINE | Admitting: EMERGENCY MEDICINE
Payer: COMMERCIAL

## 2023-04-13 VITALS
SYSTOLIC BLOOD PRESSURE: 147 MMHG | TEMPERATURE: 97.5 F | HEART RATE: 53 BPM | DIASTOLIC BLOOD PRESSURE: 82 MMHG | OXYGEN SATURATION: 100 % | WEIGHT: 150 LBS | RESPIRATION RATE: 20 BRPM | BODY MASS INDEX: 22.15 KG/M2

## 2023-04-13 DIAGNOSIS — Z23 NEED FOR DIPHTHERIA-TETANUS-PERTUSSIS (TDAP) VACCINE: ICD-10-CM

## 2023-04-13 DIAGNOSIS — S61.011A LACERATION OF RIGHT THUMB WITHOUT FOREIGN BODY WITHOUT DAMAGE TO NAIL, INITIAL ENCOUNTER: ICD-10-CM

## 2023-04-13 PROCEDURE — 12001 RPR S/N/AX/GEN/TRNK 2.5CM/<: CPT

## 2023-04-13 PROCEDURE — 99283 EMERGENCY DEPT VISIT LOW MDM: CPT | Mod: 25

## 2023-04-13 PROCEDURE — 250N000011 HC RX IP 250 OP 636: Performed by: EMERGENCY MEDICINE

## 2023-04-13 PROCEDURE — 90471 IMMUNIZATION ADMIN: CPT | Performed by: EMERGENCY MEDICINE

## 2023-04-13 PROCEDURE — 90715 TDAP VACCINE 7 YRS/> IM: CPT | Performed by: EMERGENCY MEDICINE

## 2023-04-13 RX ADMIN — CLOSTRIDIUM TETANI TOXOID ANTIGEN (FORMALDEHYDE INACTIVATED), CORYNEBACTERIUM DIPHTHERIAE TOXOID ANTIGEN (FORMALDEHYDE INACTIVATED), BORDETELLA PERTUSSIS TOXOID ANTIGEN (GLUTARALDEHYDE INACTIVATED), BORDETELLA PERTUSSIS FILAMENTOUS HEMAGGLUTININ ANTIGEN (FORMALDEHYDE INACTIVATED), BORDETELLA PERTUSSIS PERTACTIN ANTIGEN, AND BORDETELLA PERTUSSIS FIMBRIAE 2/3 ANTIGEN 0.5 ML: 5; 2; 2.5; 5; 3; 5 INJECTION, SUSPENSION INTRAMUSCULAR at 18:56

## 2023-04-13 NOTE — ED TRIAGE NOTES
Left thumb laceration from a knife at work.      Triage Assessment     Row Name 04/13/23 6780       Triage Assessment (Adult)    Airway WDL WDL       Respiratory WDL    Respiratory WDL WDL       Skin Circulation/Temperature WDL    Skin Circulation/Temperature WDL X  left thumb laceration, bleeding controlled       Cardiac WDL    Cardiac WDL WDL       Peripheral/Neurovascular WDL    Peripheral Neurovascular WDL WDL       Cognitive/Neuro/Behavioral WDL    Cognitive/Neuro/Behavioral WDL WDL

## 2023-04-14 ENCOUNTER — PATIENT OUTREACH (OUTPATIENT)
Dept: CARE COORDINATION | Facility: CLINIC | Age: 28
End: 2023-04-14
Payer: COMMERCIAL

## 2023-04-14 NOTE — ED PROVIDER NOTES
EMERGENCY DEPARTMENT ENCOUNTER      NAME: Saad Lyles Jr.  AGE: 27 year old male  YOB: 1995  MRN: [id number]  EVALUATION DATE & TIME: 4/13/2023  6:46 PM    PCP: Deya Esparza    ED PROVIDER: Radha Ashby MD    Chief Complaint   Patient presents with     Laceration         FINAL IMPRESSION:  1. Laceration of right thumb without foreign body without damage to nail, initial encounter    2. Need for diphtheria-tetanus-pertussis (Tdap) vaccine          ED COURSE & MEDICAL DECISION MAKING:    Pertinent Labs & Imaging studies reviewed. (See chart for details)  27 year old male with history of pression right-hand-dominant  who presents to the Emergency Department for evaluation of after he lacerated his left thumb at work.  Laceration is superficial and was repaired with a combination of a finger turnicot and Dermabond.  Intrinsic hand movements intact.  Tetanus updated.  Discharged home.           Medical Decision Making    History:    Supplemental history from: Documented in chart, if applicable coworker at bedside    External Record(s) reviewed: Documented in chart, if applicable.    Work Up:    Chart documentation includes differential considered and any EKGs or imaging independently interpreted by provider, where specified.    In additional to work up documented, I considered the following work up: Documented in chart, if applicable.    External consultation:    Discussion of management with another provider: Documented in chart, if applicable    Complicating factors:    Care impacted by chronic illness: Depression    Care affected by social determinants of health: Access to care    Disposition considerations: Discharge. No recommendations on prescription strength medication(s). See documentation for any additional details.        At the conclusion of the encounter I discussed the results of all of the tests and the disposition. The questions were answered. The patient or family acknowledged  understanding and was agreeable with the care plan.    MEDICATIONS GIVEN IN THE EMERGENCY:  Medications   Tdap (tetanus-diphtheria-acell pertussis) (ADACEL) injection 0.5 mL (0.5 mLs Intramuscular $Given 4/13/23 4964)       NEW PRESCRIPTIONS STARTED AT TODAY'S ER VISIT  Discharge Medication List as of 4/13/2023  6:58 PM             =================================================================    HPI    Patient information was obtained from: patient    Use of Intrepreter: N/A       Saad Lyles Jr. is a 27 year otherwise healthy male who presents by walk in escorted by coworker for evaluation of thumb laceration.    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  Patient had the owner place multiple bandages on the wound.    Patient right-hand-dominant .  Unknown last tetanus      REVIEW OF SYSTEMS  Constitutional:  Denies fever, chills, weight loss or weakness  Musculoskeletal:  Denies any new muscle/joint pain, swelling or loss of function.  Skin:  Denies rash, pallor. Positive for laceration to left thumb.      PAST MEDICAL HISTORY:  Past Medical History:   Diagnosis Date     Depression        PAST SURGICAL HISTORY:  Past Surgical History:   Procedure Laterality Date     NO HISTORY OF SURGERY         CURRENT MEDICATIONS:    Prior to Admission Medications   Prescriptions Last Dose Informant Patient Reported? Taking?   cetirizine (ZYRTEC) 10 MG tablet   No No   Sig: TAKE ONE TABLET BY MOUTH ONCE DAILY EVERY EVENING   fluticasone (FLONASE) 50 MCG/ACT spray   No No   Sig: Spray 1-2 sprays into both nostrils daily   Patient not taking: Reported on 7/2/2018      Facility-Administered Medications: None       ALLERGIES:  Allergies   Allergen Reactions     No Known Allergies        FAMILY HISTORY:  Family History   Problem Relation Age of Onset     Asthma Father      Migraines Other      Seizure Disorder Other      Hypertension Other        SOCIAL HISTORY:  Social History      Tobacco Use     Smoking status: Never     Smokeless tobacco: Never   Substance Use Topics     Alcohol use: Not Currently     Comment: sometime 3-4bottle     Drug use: No        VITALS:  Patient Vitals for the past 24 hrs:   BP Temp Temp src Pulse Resp SpO2 Weight   04/13/23 1822 -- 97.5  F (36.4  C) Temporal -- -- -- --   04/13/23 1819 (!) 147/82 -- -- 53 20 100 % 68 kg (150 lb)       PHYSICAL EXAM    General Appearance: Well-appearing, well-nourished, no acute distress  Head:  Normocephalic  Cardio:  Regular rate and rhythm  Pulm:  No respiratory distress  Extremities: Intrinsic hand movements intact.  There is a 1.7 cm superficial laceration to the distal aspect of the left thumb  Skin:  Skin warm, dry, no rashes  Neuro:  Alert and oriented ×3, moving all extremities, no gross sensory defects         PROCEDURES:  PROCEDURE: Laceration Repair   INDICATIONS: Laceration   PROCEDURE PROVIDER: Dr Radha Ashby   SITE:  Left thumb   TYPE/SIZE: simple, clean and no foreign body visualized  1.7 cm (total length)   FUNCTIONAL ASSESSMENT: Distal sensation, circulation and motor intact   MEDICATION: none   PREPARATION: scrubbing with Normal saline   DEBRIDEMENT: no debridement   CLOSURE:   Finger turnicot applied to obtains hemostasis.  Wound then closed with Dermabond.             The creation of this record is based on the scribe s observations of the work being performed by Radha Ashby MD and the provider s statements to them. It was created on her behalf by Starr Busby, a trained medical scribe. This document has been checked and approved by the attending provider.    Radha Ashby MD  Emergency Medicine  [facility] EMERGENCY DEPARTMENT  [address] [place] [id number]  [phone]  Dept: [phone]     Radha Ashby MD  04/13/23 7143

## 2023-04-14 NOTE — PROGRESS NOTES
Clinic Care Coordination Contact    Follow Up Progress Note      Assessment: The pt was recently in the ED, I called to check up on the pt and help the pt setup a ED follow up. The pt was at [facility] for a laceration. I called and talked to the pt, pt stated that he is doing fine. He did not feel that he needs a follow up. Pt did ask about a glue, that was applied to his finger. I transferred to triage nurse.     Care Gaps:    Health Maintenance Due   Topic Date Due     ADVANCE CARE PLANNING  Never done     COVID-19 Vaccine (1) Never done     HIV SCREENING  Never done     HEPATITIS C SCREENING  Never done     HPV IMMUNIZATION (3 - Male 3-dose series) 09/30/2018     YEARLY PREVENTIVE VISIT  05/30/2019     INFLUENZA VACCINE (1) Never done     PHQ-2 (once per calendar year)  01/01/2023           Care Plans      Intervention/Education provided during outreach:               Plan:     Care Coordinator will follow up in

## 2024-12-30 ENCOUNTER — HOSPITAL ENCOUNTER (EMERGENCY)
Facility: HOSPITAL | Age: 29
Discharge: HOME | End: 2024-12-30
Admitting: STUDENT IN AN ORGANIZED HEALTH CARE EDUCATION/TRAINING PROGRAM

## 2024-12-30 VITALS
WEIGHT: 170 LBS | SYSTOLIC BLOOD PRESSURE: 152 MMHG | TEMPERATURE: 99 F | OXYGEN SATURATION: 98 % | HEIGHT: 69 IN | HEART RATE: 55 BPM | DIASTOLIC BLOOD PRESSURE: 79 MMHG | RESPIRATION RATE: 18 BRPM | BODY MASS INDEX: 25.18 KG/M2

## 2024-12-30 DIAGNOSIS — L08.9 LACERATION OF FINGER WITH INFECTION, INITIAL ENCOUNTER: ICD-10-CM

## 2024-12-30 DIAGNOSIS — S61.219A LACERATION OF FINGER WITH INFECTION, INITIAL ENCOUNTER: ICD-10-CM

## 2024-12-30 PROCEDURE — 250N000013 HC RX MED GY IP 250 OP 250 PS 637

## 2024-12-30 PROCEDURE — 99283 EMERGENCY DEPT VISIT LOW MDM: CPT

## 2024-12-30 RX ORDER — CEPHALEXIN 500 MG/1
500 CAPSULE ORAL ONCE
Status: COMPLETED | OUTPATIENT
Start: 2024-12-30 | End: 2024-12-30

## 2024-12-30 RX ORDER — CEPHALEXIN 500 MG/1
500 CAPSULE ORAL 4 TIMES DAILY
Qty: 28 CAPSULE | Refills: 0 | Status: SHIPPED | OUTPATIENT
Start: 2024-12-30 | End: 2025-01-06

## 2024-12-30 RX ADMIN — CEPHALEXIN 500 MG: 500 CAPSULE ORAL at 14:10

## 2024-12-30 ASSESSMENT — COLUMBIA-SUICIDE SEVERITY RATING SCALE - C-SSRS
6. HAVE YOU EVER DONE ANYTHING, STARTED TO DO ANYTHING, OR PREPARED TO DO ANYTHING TO END YOUR LIFE?: NO
1. IN THE PAST MONTH, HAVE YOU WISHED YOU WERE DEAD OR WISHED YOU COULD GO TO SLEEP AND NOT WAKE UP?: NO
2. HAVE YOU ACTUALLY HAD ANY THOUGHTS OF KILLING YOURSELF IN THE PAST MONTH?: NO

## 2024-12-30 NOTE — ED PROVIDER NOTES
Emergency Department Encounter   NAME: Saad Lyles Jr.  AGE: 29 year old male   YOB: 1995 ;   MRN: [id number] ;    ED PROVIDER: Susan Marc PA-C    PCP: No Ref-Primary, Physician    Evaluation Date & Time:   12/30/2024  2:24 PM    CHIEF COMPLAINT:  Laceration      FINAL IMPRESSION:    ICD-10-CM    1. Laceration of finger with infection, initial encounter  S61.219A     L08.9             IMPRESSION AND PLAN   MDM: Saad Lyles Jr. is a 29 year old male with a pertinent history of allergic rhinitis who presents to the ED by walk-in for evaluation of laceration to the left index finger that occurred yesterday around 4 PM while patient was at work.  He presents to the ED today due to concerns for infection as he had removed his initial bandage and noticed purulent, foul-smelling drainage.    Vitals hypertensive otherwise vitally stable. On exam patient is well-appearing no acute distress.  Left index finger does reveal a 1 cm superficial laceration just proximal to th nailbed.  No active bleeding or drainage appreciated.  No surrounding erythema or edema concerning for cellulitis.  Certainly no evidence of felon, paronychia or flexor tenosynovitis.  Given that it is been almost 24 hours since injury, I do not feel that laceration repair is appropriate due to increased risk of infection.  I discussed with the patient and he expresses understanding.  However, given the purulent drainage, will start patient on antibiotics to prevent any further spread of infection.  First dose of Keflex given here in the emergency department.  The wound was redressed and additional materials were sent home with patient.  I encourage patient to continue Tylenol, ibuprofen and ice as needed for pain and swelling.  Patient is agreeable to this plan and feels comfortable discharge at this time.     Medical Decision Making  Obtained supplemental history:Supplemental history obtained?: No  Reviewed external records:  External records reviewed?: Documented in chart  Care impacted by chronic illness:Documented in Chart  Care significantly affected by social determinants of health:N/A  Did you consider but not order tests?: Work up considered but not performed and documented in chart, if applicable  Did you interpret images independently?: Independent interpretation of ECG and images noted in documentation, when applicable.  Consultation discussion with other provider:Did you involve another provider (consultant, , pharmacy, etc.)?: No  Discharge. I prescribed additional prescription strength medication(s) as charted. See documentation for any additional details.    Not Applicable       ED COURSE:  2:08 PM I met and introduced myself to the patient. I gathered initial history and performed my physical exam. We discussed plan for initial workup.   2:23 PM I rechecked the patient and discussed results, discharge, follow up, and reasons to return to the ED.           MEDICATIONS GIVEN IN THE EMERGENCY DEPARTMENT:  Medications   cephALEXin (KEFLEX) capsule 500 mg (500 mg Oral $Given 12/30/24 1410)         NEW PRESCRIPTIONS STARTED AT TODAY'S ED VISIT:  Discharge Medication List as of 12/30/2024  2:18 PM        START taking these medications    Details   cephALEXin (KEFLEX) 500 MG capsule Take 1 capsule (500 mg) by mouth 4 times daily for 7 days., Disp-28 capsule, R-0, Local Print               BRIEF HPI   Patient information was obtained from: Patient   Use of Intrepreter: N/A     Saad Lyles  is a 29 year old male who presents emergency department for concerns of infection of the bursa laceration.  Patient reports that yesterday, he was washing dishes at his job when a plate broke and cut the distal tuft of his left index finger, immediately proximal to the nailbed.  Patient had kept it wrapped until earlier today when he remove the dressing and noted that the laceration continued to bleed.  He was able to stop the bleeding  "after about 1 hour, the laceration was not actively bleeding when he presented to the ER.  However, during this bleeding, he did notice some purulent, foul-smelling drainage which concerned him for an infection.  He has had an infection of the laceration in the past and says this feels similar.  He otherwise denies any fever, chills, nausea, vomiting.    I reviewed ED note from 4/13/2023.  Patient presented to the ED after sustaining a laceration to his right thumb.  He is right-hand dominant.  Laceration was closed with Dermabond.    REVIEW OF SYSTEMS:  Pertinent positive and negative symptoms per HPI.       MEDICAL HISTORY     Past Medical History:   Diagnosis Date    Depression        Past Surgical History:   Procedure Laterality Date    NO HISTORY OF SURGERY         Family History   Problem Relation Age of Onset    Asthma Father     Migraines Other     Seizure Disorder Other     Hypertension Other        Social History     Tobacco Use    Smoking status: Never    Smokeless tobacco: Never   Substance Use Topics    Alcohol use: Not Currently     Comment: sometime 3-4bottle    Drug use: No         PHYSICAL EXAM     First Vitals:  Patient Vitals for the past 24 hrs:   BP Temp Pulse Resp SpO2 Height Weight   12/30/24 1119 (!) 152/79 99  F (37.2  C) 55 18 98 % 1.753 m (5' 9\") 77.1 kg (170 lb)       PHYSICAL EXAM:  Physical Exam  Vitals and nursing note reviewed.   Constitutional:       General: He is not in acute distress.     Appearance: Normal appearance. He is normal weight. He is not ill-appearing or toxic-appearing.   HENT:      Head: Normocephalic and atraumatic.   Eyes:      Conjunctiva/sclera: Conjunctivae normal.   Cardiovascular:      Rate and Rhythm: Bradycardia present.      Pulses:           Radial pulses are 2+ on the left side.   Pulmonary:      Effort: Pulmonary effort is normal.   Musculoskeletal:      Right hand: Normal.      Left hand: Laceration, tenderness and bony tenderness present. No swelling " or deformity. Normal range of motion. Normal strength. Normal sensation. Normal capillary refill. Normal pulse.      Cervical back: Normal range of motion and neck supple.      Comments: 1 cm superficial laceration just proximal to the nailbed.  No active bleeding or drainage appreciated.   Skin:     General: Skin is warm and dry.   Neurological:      General: No focal deficit present.      Mental Status: He is alert and oriented to person, place, and time.   Psychiatric:         Mood and Affect: Mood normal.          RESULTS     LAB:  All pertinent labs reviewed and interpreted  Labs Ordered and Resulted from Time of ED Arrival to Time of ED Departure - No data to display      RADIOLOGY:  No orders to display           Susan Marc PA-C  Emergency Medicine   [facility] EMERGENCY DEPARTMENT\     Susan Marc PA-C  12/30/24 1549

## 2024-12-30 NOTE — DISCHARGE INSTRUCTIONS
Based on your symptoms, I am concerned for an infection.  I will start you on antibiotics to prevent any further worsening of the infection.  In the meantime, please continue to keep the area well dressed with the materials I will provide you here in the ER.  Additionally, at work, please wear at least 1 pair of gloves to protect from any further skin damage.  Please return to the emergency department for any new or worsening symptoms.

## 2024-12-30 NOTE — ED TRIAGE NOTES
Pt ambulatory to triage c/o laceration to left index finger that occurred at 1600 yesterday. 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   Today, pt was cleaning with soap and water and observed some bleeding and perhaps a foul-smelling discharge, so presents for eval.   [place] updated 2023.